# Patient Record
Sex: FEMALE | Race: ASIAN | Employment: FULL TIME | URBAN - METROPOLITAN AREA
[De-identification: names, ages, dates, MRNs, and addresses within clinical notes are randomized per-mention and may not be internally consistent; named-entity substitution may affect disease eponyms.]

---

## 2020-01-23 ENCOUNTER — HOSPITAL ENCOUNTER (OUTPATIENT)
Dept: GENERAL RADIOLOGY | Age: 65
Discharge: HOME | End: 2020-01-23
Attending: FAMILY MEDICINE
Payer: COMMERCIAL

## 2020-01-23 DIAGNOSIS — R05.9 COUGH: ICD-10-CM

## 2020-01-23 DIAGNOSIS — J44.9 CHRONIC OBSTRUCTIVE PULMONARY DISEASE, UNSPECIFIED COPD TYPE (HCC): ICD-10-CM

## 2020-01-23 DIAGNOSIS — K21.9 GASTROESOPHAGEAL REFLUX DISEASE WITHOUT ESOPHAGITIS: ICD-10-CM

## 2020-01-23 DIAGNOSIS — J30.1 ALLERGIC RHINITIS DUE TO POLLEN, UNSPECIFIED SEASONALITY: ICD-10-CM

## 2020-01-23 PROCEDURE — 71046 X-RAY EXAM CHEST 2 VIEWS: CPT

## 2020-08-19 PROBLEM — Z00.00 WELCOME TO MEDICARE PREVENTIVE VISIT: Status: ACTIVE | Noted: 2020-08-19

## 2020-08-19 PROBLEM — Z00.00 WELCOME TO MEDICARE PREVENTIVE VISIT: Chronic | Status: ACTIVE | Noted: 2020-08-19

## 2020-10-29 ENCOUNTER — HOSPITAL ENCOUNTER (OUTPATIENT)
Dept: LAB | Age: 65
Discharge: HOME | End: 2020-10-29
Payer: MEDICARE

## 2020-10-29 DIAGNOSIS — Z86.73 HISTORY OF STROKE: ICD-10-CM

## 2020-10-29 LAB
CRP SERPL-MCNC: <0.3 MG/DL (ref 0–0.9)
ERYTHROCYTE [SEDIMENTATION RATE] IN BLOOD: 12 MM/HR (ref 0–30)

## 2020-10-29 PROCEDURE — 86140 C-REACTIVE PROTEIN: CPT

## 2020-10-29 PROCEDURE — 85652 RBC SED RATE AUTOMATED: CPT

## 2020-10-29 PROCEDURE — 36415 COLL VENOUS BLD VENIPUNCTURE: CPT

## 2021-12-13 LAB — MAMMOGRAPHY, EXTERNAL: NORMAL

## 2022-03-19 PROBLEM — Z00.00 MEDICARE ANNUAL WELLNESS VISIT, SUBSEQUENT: Status: ACTIVE | Noted: 2020-08-19

## 2022-09-08 ENCOUNTER — OFFICE VISIT (OUTPATIENT)
Dept: FAMILY MEDICINE CLINIC | Facility: CLINIC | Age: 67
End: 2022-09-08
Payer: MEDICARE

## 2022-09-08 VITALS
WEIGHT: 97 LBS | SYSTOLIC BLOOD PRESSURE: 118 MMHG | BODY MASS INDEX: 19.56 KG/M2 | HEIGHT: 59 IN | DIASTOLIC BLOOD PRESSURE: 62 MMHG

## 2022-09-08 DIAGNOSIS — Z90.710 S/P TAH-BSO (TOTAL ABDOMINAL HYSTERECTOMY AND BILATERAL SALPINGO-OOPHORECTOMY): ICD-10-CM

## 2022-09-08 DIAGNOSIS — Z90.722 S/P TAH-BSO (TOTAL ABDOMINAL HYSTERECTOMY AND BILATERAL SALPINGO-OOPHORECTOMY): ICD-10-CM

## 2022-09-08 DIAGNOSIS — I10 ESSENTIAL HYPERTENSION: ICD-10-CM

## 2022-09-08 DIAGNOSIS — Z90.79 S/P TAH-BSO (TOTAL ABDOMINAL HYSTERECTOMY AND BILATERAL SALPINGO-OOPHORECTOMY): ICD-10-CM

## 2022-09-08 DIAGNOSIS — Z86.73 HISTORY OF CVA (CEREBROVASCULAR ACCIDENT): ICD-10-CM

## 2022-09-08 DIAGNOSIS — E78.00 HYPERCHOLESTEROLEMIA: ICD-10-CM

## 2022-09-08 DIAGNOSIS — I10 ESSENTIAL HYPERTENSION: Primary | ICD-10-CM

## 2022-09-08 PROBLEM — Z00.00 MEDICARE ANNUAL WELLNESS VISIT, SUBSEQUENT: Chronic | Status: ACTIVE | Noted: 2020-08-19

## 2022-09-08 PROBLEM — Z84.2 FAMILY HISTORY OF TOTAL ABDOMINAL HYSTERECTOMY AND BILATERAL SALPINGO-OOPHORECTOMY (TAH-BSO): Status: ACTIVE | Noted: 2022-09-08

## 2022-09-08 PROCEDURE — 1090F PRES/ABSN URINE INCON ASSESS: CPT | Performed by: FAMILY MEDICINE

## 2022-09-08 PROCEDURE — G8420 CALC BMI NORM PARAMETERS: HCPCS | Performed by: FAMILY MEDICINE

## 2022-09-08 PROCEDURE — 1123F ACP DISCUSS/DSCN MKR DOCD: CPT | Performed by: FAMILY MEDICINE

## 2022-09-08 PROCEDURE — G8400 PT W/DXA NO RESULTS DOC: HCPCS | Performed by: FAMILY MEDICINE

## 2022-09-08 PROCEDURE — 3017F COLORECTAL CA SCREEN DOC REV: CPT | Performed by: FAMILY MEDICINE

## 2022-09-08 PROCEDURE — 99214 OFFICE O/P EST MOD 30 MIN: CPT | Performed by: FAMILY MEDICINE

## 2022-09-08 PROCEDURE — G8428 CUR MEDS NOT DOCUMENT: HCPCS | Performed by: FAMILY MEDICINE

## 2022-09-08 PROCEDURE — 4004F PT TOBACCO SCREEN RCVD TLK: CPT | Performed by: FAMILY MEDICINE

## 2022-09-08 RX ORDER — OXYCODONE HYDROCHLORIDE 5 MG/1
5 TABLET ORAL EVERY 4 HOURS PRN
COMMUNITY

## 2022-09-08 RX ORDER — SIMVASTATIN 20 MG
20 TABLET ORAL NIGHTLY
Qty: 90 TABLET | Refills: 3 | Status: SHIPPED | OUTPATIENT
Start: 2022-09-08

## 2022-09-08 RX ORDER — PANTOPRAZOLE SODIUM 40 MG/1
40 TABLET, DELAYED RELEASE ORAL DAILY
Qty: 90 TABLET | Refills: 3 | Status: SHIPPED | OUTPATIENT
Start: 2022-09-08

## 2022-09-08 RX ORDER — PANTOPRAZOLE SODIUM 40 MG/1
40 TABLET, DELAYED RELEASE ORAL DAILY
COMMUNITY
End: 2022-09-08 | Stop reason: SDUPTHER

## 2022-09-08 RX ORDER — CLOPIDOGREL BISULFATE 75 MG/1
75 TABLET ORAL DAILY
Qty: 90 TABLET | Refills: 3 | Status: SHIPPED | OUTPATIENT
Start: 2022-09-08

## 2022-09-08 RX ORDER — ACETAMINOPHEN 325 MG/1
650 TABLET ORAL EVERY 6 HOURS PRN
COMMUNITY

## 2022-09-08 ASSESSMENT — PATIENT HEALTH QUESTIONNAIRE - PHQ9
SUM OF ALL RESPONSES TO PHQ9 QUESTIONS 1 & 2: 0
SUM OF ALL RESPONSES TO PHQ QUESTIONS 1-9: 0
2. FEELING DOWN, DEPRESSED OR HOPELESS: 0
SUM OF ALL RESPONSES TO PHQ QUESTIONS 1-9: 0
SUM OF ALL RESPONSES TO PHQ QUESTIONS 1-9: 0
1. LITTLE INTEREST OR PLEASURE IN DOING THINGS: 0
SUM OF ALL RESPONSES TO PHQ QUESTIONS 1-9: 0

## 2022-09-08 NOTE — PROGRESS NOTES
Inhale 2 puffs into the lungs every 6 hours as needed       No current facility-administered medications for this visit. Lab Results   Component Value Date/Time     02/14/2022 09:03 AM    K 3.9 02/14/2022 09:03 AM     02/14/2022 09:03 AM    CO2 24 02/14/2022 09:03 AM    BUN 16 02/14/2022 09:03 AM    CREATININE 0.59 02/14/2022 09:03 AM    GLUCOSE 97 02/14/2022 09:03 AM    CALCIUM 9.0 02/14/2022 09:03 AM        Objective:  Blood pressure 118/62, height 4' 11\" (1.499 m), weight 97 lb (44 kg). Body mass index is 19.59 kg/m². BP Readings from Last 3 Encounters:   09/08/22 118/62   05/19/22 122/70   02/14/22 128/60     General- Pleasant and no distress  Psych- alert and oriented to person, place and time  Mood and affect are appropriate to situation  Musculoskeletal - Gait and station examination reveals mid-position with no abnormalities. Neurological- grossly intact. rrr s mrg.   bcta  extremeties are without edema, and dp, and pt pulses are intact  No carotid bruits   Fundoscopic exam is: benign without retinopathology     Assessment:  1. Essential hypertension    2. Hypercholesterolemia    3. History of CVA (cerebrovascular accident)    4. S/P ALEX-BSO (total abdominal hysterectomy and bilateral salpingo-oophorectomy)        Plan:   Prescription drug management occurs today as follows:  Because current regimen for blood pressure is now working and tolerated, will continue medications as listed    Krystina Ramos has been given the following recommendations today due to her elevated BP reading: lab tests ordered. Personal instruction is given. For your information, consider the following:  Jasmyn Trevino is an excellent site that will give you a list of approved blood pressure devices  Brennen Burroughs is an excellent site that will teach you how to take accurate bp measurements at home. Significant weight loss can result in a drop of 5-10mm blood pressure.   A DASH diet (see bookstore or go to [email or web address] and print DASH diet) will lower 8-14mm blood pressure. Salt restriction will lower your bp 2-8mm. Lowering alcohol consumption to moderate use will lower your pressure 2-4mm. Continue efforts to maintain an exercise regimen. Normal blood pressure target is now 120/80. Stage 1 or \"pre-hypertension\" or \"high normal hypertension\" is 130-139/80-89. We sometimes begin treatment with medications. Stage 2 is >140/90 which is when we always begin treatment with medications. For high risk patients such as those with heart disease, a history of stents, angioplasty, heart attacks, strokes, diabetes and chronic kidney disease,your blood pressure goal is 130/80. For lower risk patients without the high risk categories, your goal for blood pressure is 139/89. Unless you are older than 72years old we may try for a systolic bp of 019 or we will accept higher pressures if the lower pressures are not tolerated. You can now be seen once a year for your medical conditions and refills. I gave you a 3 months supply of medicines with a years worth of refills today. Come in once a year for your medicare well visit. Your medicare well visit is soon. 1. Essential hypertension  -     Basic Metabolic Panel; Future  2. Hypercholesterolemia  -     Lipid Panel; Future  -     simvastatin (ZOCOR) 20 MG tablet; Take 1 tablet by mouth nightly, Disp-90 tablet, R-3Normal  3. History of CVA (cerebrovascular accident)  -     Lipid Panel; Future  -     clopidogrel (PLAVIX) 75 MG tablet; Take 1 tablet by mouth daily, Disp-90 tablet, R-3Normal  4. S/P ALEX-BSO (total abdominal hysterectomy and bilateral salpingo-oophorectomy)    Followup:  Return in about 1 year (around 9/8/2023), or annual labs. 1 month for overdue mwv.

## 2022-09-08 NOTE — PATIENT INSTRUCTIONS
You can now be seen once a year for your medical conditions and refills. I gave you a 3 months supply of medicines with a years worth of refills today. Come in once a year for your medicare well visit. Your medicare well visit is soon.

## 2022-09-09 LAB
ANION GAP SERPL CALC-SCNC: 0 MMOL/L (ref 4–13)
BUN SERPL-MCNC: 15 MG/DL (ref 8–23)
CALCIUM SERPL-MCNC: 9.1 MG/DL (ref 8.3–10.4)
CHLORIDE SERPL-SCNC: 109 MMOL/L (ref 101–110)
CHOLEST SERPL-MCNC: 146 MG/DL
CO2 SERPL-SCNC: 30 MMOL/L (ref 21–32)
CREAT SERPL-MCNC: 0.6 MG/DL (ref 0.6–1)
GLUCOSE SERPL-MCNC: 100 MG/DL (ref 65–100)
HDLC SERPL-MCNC: 66 MG/DL (ref 40–60)
HDLC SERPL: 2.2 {RATIO}
LDLC SERPL CALC-MCNC: 53.4 MG/DL
POTASSIUM SERPL-SCNC: 4.3 MMOL/L (ref 3.5–5.1)
SODIUM SERPL-SCNC: 139 MMOL/L (ref 136–145)
TRIGL SERPL-MCNC: 133 MG/DL (ref 35–150)
VLDLC SERPL CALC-MCNC: 26.6 MG/DL (ref 6–23)

## 2022-10-08 PROBLEM — Z00.00 MEDICARE ANNUAL WELLNESS VISIT, SUBSEQUENT: Chronic | Status: RESOLVED | Noted: 2020-08-19 | Resolved: 2022-10-08

## 2022-10-12 ENCOUNTER — OFFICE VISIT (OUTPATIENT)
Dept: FAMILY MEDICINE CLINIC | Facility: CLINIC | Age: 67
End: 2022-10-12
Payer: MEDICARE

## 2022-10-12 VITALS
HEIGHT: 59 IN | BODY MASS INDEX: 20.36 KG/M2 | DIASTOLIC BLOOD PRESSURE: 64 MMHG | SYSTOLIC BLOOD PRESSURE: 108 MMHG | WEIGHT: 101 LBS

## 2022-10-12 DIAGNOSIS — Z00.00 MEDICARE ANNUAL WELLNESS VISIT, SUBSEQUENT: Chronic | ICD-10-CM

## 2022-10-12 DIAGNOSIS — Z71.89 ADVANCED CARE PLANNING/COUNSELING DISCUSSION: ICD-10-CM

## 2022-10-12 DIAGNOSIS — Z23 IMMUNIZATION, PNEUMOCOCCUS AND INFLUENZA: Primary | ICD-10-CM

## 2022-10-12 PROCEDURE — G0009 ADMIN PNEUMOCOCCAL VACCINE: HCPCS | Performed by: FAMILY MEDICINE

## 2022-10-12 PROCEDURE — 3017F COLORECTAL CA SCREEN DOC REV: CPT | Performed by: FAMILY MEDICINE

## 2022-10-12 PROCEDURE — 90677 PCV20 VACCINE IM: CPT | Performed by: FAMILY MEDICINE

## 2022-10-12 PROCEDURE — 1123F ACP DISCUSS/DSCN MKR DOCD: CPT | Performed by: FAMILY MEDICINE

## 2022-10-12 PROCEDURE — G8484 FLU IMMUNIZE NO ADMIN: HCPCS | Performed by: FAMILY MEDICINE

## 2022-10-12 PROCEDURE — 99497 ADVNCD CARE PLAN 30 MIN: CPT | Performed by: FAMILY MEDICINE

## 2022-10-12 PROCEDURE — G0439 PPPS, SUBSEQ VISIT: HCPCS | Performed by: FAMILY MEDICINE

## 2022-10-12 ASSESSMENT — PATIENT HEALTH QUESTIONNAIRE - PHQ9
2. FEELING DOWN, DEPRESSED OR HOPELESS: 0
SUM OF ALL RESPONSES TO PHQ QUESTIONS 1-9: 0
SUM OF ALL RESPONSES TO PHQ9 QUESTIONS 1 & 2: 0
SUM OF ALL RESPONSES TO PHQ QUESTIONS 1-9: 0
1. LITTLE INTEREST OR PLEASURE IN DOING THINGS: 0
SUM OF ALL RESPONSES TO PHQ QUESTIONS 1-9: 0
SUM OF ALL RESPONSES TO PHQ QUESTIONS 1-9: 0

## 2022-10-12 ASSESSMENT — LIFESTYLE VARIABLES
HOW OFTEN DO YOU HAVE A DRINK CONTAINING ALCOHOL: NEVER
HOW MANY STANDARD DRINKS CONTAINING ALCOHOL DO YOU HAVE ON A TYPICAL DAY: PATIENT DECLINED

## 2022-10-12 NOTE — PATIENT INSTRUCTIONS
Personalized Preventive Plan for Leonie Mirza - 10/12/2022  Medicare offers a range of preventive health benefits. Some of the tests and screenings are paid in full while other may be subject to a deductible, co-insurance, and/or copay. Some of these benefits include a comprehensive review of your medical history including lifestyle, illnesses that may run in your family, and various assessments and screenings as appropriate. After reviewing your medical record and screening and assessments performed today your provider may have ordered immunizations, labs, imaging, and/or referrals for you. A list of these orders (if applicable) as well as your Preventive Care list are included within your After Visit Summary for your review. Other Preventive Recommendations:    A preventive eye exam performed by an eye specialist is recommended every 1-2 years to screen for glaucoma; cataracts, macular degeneration, and other eye disorders. A preventive dental visit is recommended every 6 months. Try to get at least 150 minutes of exercise per week or 10,000 steps per day on a pedometer . Order or download the FREE \"Exercise & Physical Activity: Your Everyday Guide\" from The [employer]. Call 5-502.214.7960 or search The [employer] online. You need 9826-1540 mg of calcium and 4826-1616 IU of vitamin D per day. It is possible to meet your calcium requirement with diet alone, but a vitamin D supplement is usually necessary to meet this goal.  When exposed to the sun, use a sunscreen that protects against both UVA and UVB radiation with an SPF of 30 or greater. Reapply every 2 to 3 hours or after sweating, drying off with a towel, or swimming. Always wear a seat belt when traveling in a car. Always wear a helmet when riding a bicycle or motorcycle.   I want to emphasize that you need to let others know about your care preferences including your family members and maybe even a close friend or neighbor you trust.  I have explained what an advanced care directive is and will give you more information at your exit today on your out-processing paper. Most of these wishes will require completion of standard forms so be prepared to sit down and fill those out. You will then want to keep copies in your home, probably a copy with a close relative, and one in your lock-box. You will want to include your short term philosophy of treatment options that you are comfortable with as well as the more important preferences for long-term goals and choices for care  It is important that you include the types of care that you will prefer such as aggressive surgeries or radiation/chemotherapy should you contract a cancer; whether you want to be hospitalized for inoperable cancers; whether you decide to be a \"no code\"or similar decision to avoid \"heroic\" measures such as cpr, intubation, feeding tubes etc.    Also your feelings about Palliative Care in your home should you have a terminal disease OR Palliative Care in a separate facility of your choice. Comfort level is something you must decide on and inform a family member your desires for that. The absolute bare minimum of what you need to have is a Living Will and a [address]. A \"health care power of \" is a document that allows you to appoint someone to make your health care decisions in the event that you are unable to to so. You should also appoint a backup person in the event your first choice is not available. This document can be printed from the following website and completed free of charge without the services of an :  [email or web address].gov/SiteCollectionDocuments/[email or web address] [email or web address]. pdf    A \"Living Will\" is a document that gives you the ability to state your wishes in writing about end of life health care decisions.   It allows you to choose if you will receive CPR in the event of a life threatening emergency. It also allows you to choose or refuse specific emergency treatments such as chest compressions, artificial respirations, emergency medications, a mechanical ventilator and/or a tube feeding. This document can be prepared in the privacy of your own home and notarized at your bank. The Living Will form can be printed out at this website: [email or web address].sc.gov/SiteCollectionDocuments/L/[email or web address]. .pdf    Because of the [place] pandemic, physicians are learning of the importance of speaking with our patients regarding their preferences for end-of-life medical care and other measures aimed at prolonging health and extending life. The importance of understanding your values and goals if you were to develop a serious illness requiring hospitalization, mechanical ventilation, or CPR not limited to something like COVID-19, but influenza, and other serious illnesses is important so I want to document your preferences for the following:  Who is your Medical Decision Maker should you become incapacitated?zulma Waddell  [phone]  CPR preference? yes do  Ventilator preference? yes do  Hospitalization preference? closest, either    You may bring these completed forms to the office to be scanned into your medical record, but it is more important that you give a copy to each of your family members, discuss them with your chosen representative, and let them know where the original is kept.

## 2022-10-12 NOTE — PROGRESS NOTES
Medicare Annual Wellness Visit    [address] is here for Medicare AWV    Assessment & Plan   Immunization, pneumococcus and influenza  -     Pneumococcal, PCV20, PREVNAR 21, (age 25 yrs+), IM, PF  Medicare annual wellness visit, subsequent  Advanced care planning/counseling discussion    Recommendations for Preventive Services Due: see orders and patient instructions/AVS.  Recommended screening schedule for the next 5-10 years is provided to the patient in written form: see Patient Instructions/AVS.  I also wanted to take advantage of their presence here today to complete an   [address] which is voluntary (at discretion of the patient but I have informed them that this service has been reccommended as a worthwhile service). No follow-ups on file. Subjective       Patient's complete Health Risk Assessment and screening values have been reviewed and are found in Flowsheets. The following problems were reviewed today and where indicated follow up appointments were made and/or referrals ordered. Positive Risk Factor Screenings with Interventions:             General Health and ACP:  General  In general, how would you say your health is?: Fair  In the past 7 days, have you experienced any of the following: New or Increased Pain, New or Increased Fatigue, Loneliness, Social Isolation, Stress or Anger?: No  Do you get the social and emotional support that you need?: Yes  Do you have a Living Will?: (!) No    Advance Directives       Power of  Living Will ACP-Advance Directive ACP-Power of     Not on File Not on File Not on File Not on File          General Health Risk Interventions:   Will address living will      Safety:  Do you have working smoke detectors?: Yes  Do you have any tripping hazards - loose or unsecured carpets or rugs?: No  Do you have any tripping hazards - clutter in doorways, halls, or stairs?: No  Do you have either shower bars, grab bars, non-slip mats or non-slip surfaces in your shower or bathtub?: (!) No  Do all of your stairways have a railing or banister?: Not Applicable  Do you always fasten your seatbelt when you are in a car?: Yes  Safety Interventions:  aware           Objective   Vitals:    10/12/22 1024   BP: 108/64   Site: Left Upper Arm   Position: Sitting   Weight: 101 lb (45.8 kg)   Height: 4' 11\" (1.499 m)      Body mass index is 20.4 kg/m². Allergies   Allergen Reactions    Gemfibrozil Other (See Comments)    Ibandronic Acid Other (See Comments)    Ibuprofen Other (See Comments)     Prior to Visit Medications    Medication Sig Taking? Authorizing Provider   acetaminophen (TYLENOL) 325 MG tablet Take 650 mg by mouth every 6 hours as needed for Pain Take 2 tablets by mouth every 6 hours as needed for mild to moderate pain. Historical Provider, MD   oxyCODONE (ROXICODONE) 5 MG immediate release tablet Take 5 mg by mouth every 4 hours as needed for Pain. Historical Provider, MD   pantoprazole (PROTONIX) 40 MG tablet Take 1 tablet by mouth daily  Betsy Lugo MD   clopidogrel (PLAVIX) 75 MG tablet Take 1 tablet by mouth daily  Betsy Lugo MD   simvastatin (ZOCOR) 20 MG tablet Take 1 tablet by mouth nightly  Betsy Lugo MD   albuterol sulfate  (90 Base) MCG/ACT inhaler Inhale 2 puffs into the lungs every 6 hours as needed  Ar Automatic Reconciliation   MAMMOGRAM:  is due and reccommended  PNEUMONIA VACCINES: Prevnar is due because of HIGH RISK  SHINGLES VACCINE:  Is currently up to date or is clinically not indicated  COLONOSCOPY: Is currently up to date or is clinically not indicated  FIT/COLOGUARD CANDIDATE?:  (Age 46-80, no history of adenomatous polyps, UC or Chrohns or Fam Hx Colon Ca) NO  GLAUCOMA/VISION SCREENING:  Is currently up to date or is clinically not indicated  DEXA SCREENING: initial screening is reccommended in all men over the age of 79 and all women over age 72.   Is currently up to date or is clinically not indicated     needed to discuss advanced care directive, end of life planning, will, etc; information given? Yes    Patient is the designated person who is capable of making the decisions we will discuss  Some of today's visit spent counseling for [address] We mainly discuss future care decisions that need to be made or will soon need to be made. I want to emphasize that you need to let others know about your care preferences including your family members and maybe even a close friend or neighbor you trust.  I have explained what an advanced care directive is and will give you more information at your exit today on your out-processing paper. Most of these wishes will require completion of standard forms so be prepared to sit down and fill those out. You will then want to keep copies in your home, probably a copy with a close relative, and one in your lock-box. You will want to include your short term philosophy of treatment options that you are comfortable with as well as the more important preferences for long-term goals and choices for care  It is important that you include the types of care that you will prefer such as aggressive surgeries or radiation/chemotherapy should you contract a cancer; whether you want to be hospitalized for inoperable cancers; whether you decide to be a \"no code\"or similar decision to avoid \"heroic\" measures such as cpr, intubation, feeding tubes etc.    Also your feelings about Palliative Care in your home should you have a terminal disease OR Palliative Care in a separate facility of your choice. Comfort level is something you must decide on and inform a family member your desires for that. The absolute bare minimum of what you need to have is a Living Will and a [address].     A \"health care power of \" is a document that allows you to appoint someone to make your health care decisions in the event that you are unable to to so.  You should also appoint a backup person in the event your first choice is not available. This document can be printed from the following website and completed free of charge without the services of an :  [email or web address].sc.gov/SiteCollectionDocuments/S/SCHeGundersen St Joseph's Hospital and ClinicsPowerOfAttorney. pdf    A \"Living Will\" is a document that gives you the ability to state your wishes in writing about end of life health care decisions. It allows you to choose if you will receive CPR in the event of a life threatening emergency. It also allows you to choose or refuse specific emergency treatments such as chest compressions, artificial respirations, emergency medications, a mechanical ventilator and/or a tube feeding. This document can be prepared in the privacy of your own home and notarized at your bank. The Living Will form can be printed out at this website: http://aging.sc.gov/SiteCollectionDocuments/L/[email or web address]%28Bar%29. .pdf    Because of the [place] pandemic, physicians are learning of the importance of speaking with our patients regarding their preferences for end-of-life medical care and other measures aimed at prolonging health and extending life. The importance of understanding your values and goals if you were to develop a serious illness requiring hospitalization, mechanical ventilation, or CPR not limited to something like COVID-19, but influenza, and other serious illnesses is important so I want to document your preferences for the following:  Who is your Medical Decision Maker should you become incapacitated?zulma Waddell  [phone]  CPR preference? yes do  Ventilator preference? yes do  Hospitalization preference? closest, either    You may bring these completed forms to the office to be scanned into your medical record, but it is more important that you give a copy to each of your family members, discuss them with your chosen representative, and let them know where the original is kept.     Face to Face time pertaining to the Advanced Care dialogue only (not the other portions of the office visit related to evaluation and management of medical problems) with the patient,  first 30 minutes; specifically, the approximate time I spent on this today was right at 16-17minutes. Assessment/Plan:     1. Immunization, pneumococcus and influenza    2. Medicare annual wellness visit, subsequent    3.  Advanced care planning/counseling discussion      CareTeam (Including outside providers/suppliers regularly involved in providing care):   Patient Care Team:  Guadalupe Snellen, MD as PCP - Eliazar Gandhi MD as PCP - [facility] Empaneled Provider     Reviewed and updated this visit:  Allergies

## 2022-11-11 PROBLEM — Z00.00 MEDICARE ANNUAL WELLNESS VISIT, SUBSEQUENT: Chronic | Status: RESOLVED | Noted: 2020-08-19 | Resolved: 2022-11-11

## 2023-09-08 ENCOUNTER — OFFICE VISIT (OUTPATIENT)
Dept: FAMILY MEDICINE CLINIC | Facility: CLINIC | Age: 68
End: 2023-09-08

## 2023-09-08 VITALS
TEMPERATURE: 97 F | SYSTOLIC BLOOD PRESSURE: 130 MMHG | OXYGEN SATURATION: 97 % | WEIGHT: 110.2 LBS | RESPIRATION RATE: 16 BRPM | BODY MASS INDEX: 22.22 KG/M2 | HEART RATE: 64 BPM | HEIGHT: 59 IN | DIASTOLIC BLOOD PRESSURE: 70 MMHG

## 2023-09-08 DIAGNOSIS — I10 ESSENTIAL HYPERTENSION: Primary | ICD-10-CM

## 2023-09-08 DIAGNOSIS — E78.00 HYPERCHOLESTEROLEMIA: ICD-10-CM

## 2023-09-08 DIAGNOSIS — Z12.11 SPECIAL SCREENING FOR MALIGNANT NEOPLASMS, COLON: ICD-10-CM

## 2023-09-08 DIAGNOSIS — Z86.73 HISTORY OF CVA (CEREBROVASCULAR ACCIDENT): ICD-10-CM

## 2023-09-08 DIAGNOSIS — J41.0 SIMPLE CHRONIC BRONCHITIS (HCC): ICD-10-CM

## 2023-09-08 LAB
ALBUMIN SERPL-MCNC: 3.8 G/DL (ref 3.2–4.6)
ALBUMIN/GLOB SERPL: 1 (ref 0.4–1.6)
ALP SERPL-CCNC: 100 U/L (ref 50–136)
ALT SERPL-CCNC: 21 U/L (ref 12–65)
ANION GAP SERPL CALC-SCNC: 5 MMOL/L (ref 2–11)
AST SERPL-CCNC: 14 U/L (ref 15–37)
BILIRUB DIRECT SERPL-MCNC: 0.1 MG/DL
BILIRUB SERPL-MCNC: 0.4 MG/DL (ref 0.2–1.1)
BUN SERPL-MCNC: 16 MG/DL (ref 8–23)
CALCIUM SERPL-MCNC: 8.9 MG/DL (ref 8.3–10.4)
CHLORIDE SERPL-SCNC: 110 MMOL/L (ref 101–110)
CHOLEST SERPL-MCNC: 179 MG/DL
CO2 SERPL-SCNC: 27 MMOL/L (ref 21–32)
CREAT SERPL-MCNC: 0.7 MG/DL (ref 0.6–1)
GLOBULIN SER CALC-MCNC: 3.9 G/DL (ref 2.8–4.5)
GLUCOSE SERPL-MCNC: 102 MG/DL (ref 65–100)
HDLC SERPL-MCNC: 70 MG/DL (ref 40–60)
HDLC SERPL: 2.6
LDLC SERPL CALC-MCNC: 61.6 MG/DL
POTASSIUM SERPL-SCNC: 4.1 MMOL/L (ref 3.5–5.1)
PROT SERPL-MCNC: 7.7 G/DL (ref 6.3–8.2)
SODIUM SERPL-SCNC: 142 MMOL/L (ref 133–143)
TRIGL SERPL-MCNC: 237 MG/DL (ref 35–150)
VLDLC SERPL CALC-MCNC: 47.4 MG/DL (ref 6–23)

## 2023-09-08 RX ORDER — ALENDRONATE SODIUM 70 MG/1
70 TABLET ORAL
COMMUNITY

## 2023-09-08 RX ORDER — CLOPIDOGREL BISULFATE 75 MG/1
75 TABLET ORAL DAILY
Qty: 90 TABLET | Refills: 3 | Status: SHIPPED | OUTPATIENT
Start: 2023-09-08

## 2023-09-08 RX ORDER — PANTOPRAZOLE SODIUM 40 MG/1
40 TABLET, DELAYED RELEASE ORAL DAILY
Qty: 90 TABLET | Refills: 3 | Status: SHIPPED | OUTPATIENT
Start: 2023-09-08

## 2023-09-08 RX ORDER — SIMVASTATIN 20 MG
20 TABLET ORAL NIGHTLY
Qty: 90 TABLET | Refills: 3 | Status: SHIPPED | OUTPATIENT
Start: 2023-09-08

## 2023-09-08 SDOH — ECONOMIC STABILITY: FOOD INSECURITY: WITHIN THE PAST 12 MONTHS, YOU WORRIED THAT YOUR FOOD WOULD RUN OUT BEFORE YOU GOT MONEY TO BUY MORE.: NEVER TRUE

## 2023-09-08 SDOH — ECONOMIC STABILITY: INCOME INSECURITY: HOW HARD IS IT FOR YOU TO PAY FOR THE VERY BASICS LIKE FOOD, HOUSING, MEDICAL CARE, AND HEATING?: NOT VERY HARD

## 2023-09-08 SDOH — ECONOMIC STABILITY: FOOD INSECURITY: WITHIN THE PAST 12 MONTHS, THE FOOD YOU BOUGHT JUST DIDN'T LAST AND YOU DIDN'T HAVE MONEY TO GET MORE.: NEVER TRUE

## 2023-09-08 SDOH — ECONOMIC STABILITY: HOUSING INSECURITY
IN THE LAST 12 MONTHS, WAS THERE A TIME WHEN YOU DID NOT HAVE A STEADY PLACE TO SLEEP OR SLEPT IN A SHELTER (INCLUDING NOW)?: NO

## 2023-09-08 ASSESSMENT — PATIENT HEALTH QUESTIONNAIRE - PHQ9
SUM OF ALL RESPONSES TO PHQ QUESTIONS 1-9: 0
1. LITTLE INTEREST OR PLEASURE IN DOING THINGS: 0
SUM OF ALL RESPONSES TO PHQ QUESTIONS 1-9: 0
2. FEELING DOWN, DEPRESSED OR HOPELESS: 0
SUM OF ALL RESPONSES TO PHQ9 QUESTIONS 1 & 2: 0

## 2023-10-25 ENCOUNTER — OFFICE VISIT (OUTPATIENT)
Dept: FAMILY MEDICINE CLINIC | Facility: CLINIC | Age: 68
End: 2023-10-25
Payer: MEDICARE

## 2023-10-25 VITALS
SYSTOLIC BLOOD PRESSURE: 150 MMHG | WEIGHT: 110 LBS | RESPIRATION RATE: 16 BRPM | HEIGHT: 59 IN | TEMPERATURE: 97.3 F | BODY MASS INDEX: 22.18 KG/M2 | HEART RATE: 69 BPM | DIASTOLIC BLOOD PRESSURE: 74 MMHG | OXYGEN SATURATION: 100 %

## 2023-10-25 DIAGNOSIS — Z00.00 MEDICARE ANNUAL WELLNESS VISIT, SUBSEQUENT: Primary | Chronic | ICD-10-CM

## 2023-10-25 DIAGNOSIS — Z98.890 HISTORY OF COLONOSCOPY: ICD-10-CM

## 2023-10-25 PROCEDURE — 1123F ACP DISCUSS/DSCN MKR DOCD: CPT | Performed by: FAMILY MEDICINE

## 2023-10-25 PROCEDURE — 3078F DIAST BP <80 MM HG: CPT | Performed by: FAMILY MEDICINE

## 2023-10-25 PROCEDURE — G0439 PPPS, SUBSEQ VISIT: HCPCS | Performed by: FAMILY MEDICINE

## 2023-10-25 PROCEDURE — 3077F SYST BP >= 140 MM HG: CPT | Performed by: FAMILY MEDICINE

## 2023-10-25 PROCEDURE — 3017F COLORECTAL CA SCREEN DOC REV: CPT | Performed by: FAMILY MEDICINE

## 2023-10-25 PROCEDURE — G8484 FLU IMMUNIZE NO ADMIN: HCPCS | Performed by: FAMILY MEDICINE

## 2023-10-25 ASSESSMENT — PATIENT HEALTH QUESTIONNAIRE - PHQ9
SUM OF ALL RESPONSES TO PHQ9 QUESTIONS 1 & 2: 0
SUM OF ALL RESPONSES TO PHQ QUESTIONS 1-9: 0
1. LITTLE INTEREST OR PLEASURE IN DOING THINGS: 0
SUM OF ALL RESPONSES TO PHQ QUESTIONS 1-9: 0
SUM OF ALL RESPONSES TO PHQ QUESTIONS 1-9: 0
2. FEELING DOWN, DEPRESSED OR HOPELESS: 0
SUM OF ALL RESPONSES TO PHQ QUESTIONS 1-9: 0

## 2023-10-25 ASSESSMENT — LIFESTYLE VARIABLES
HOW OFTEN DO YOU HAVE A DRINK CONTAINING ALCOHOL: NEVER
HOW MANY STANDARD DRINKS CONTAINING ALCOHOL DO YOU HAVE ON A TYPICAL DAY: PATIENT DOES NOT DRINK

## 2023-10-25 NOTE — PATIENT INSTRUCTIONS
benefits include a comprehensive review of your medical history including lifestyle, illnesses that may run in your family, and various assessments and screenings as appropriate. After reviewing your medical record and screening and assessments performed today your provider may have ordered immunizations, labs, imaging, and/or referrals for you. A list of these orders (if applicable) as well as your Preventive Care list are included within your After Visit Summary for your review. Other Preventive Recommendations:    A preventive eye exam performed by an eye specialist is recommended every 1-2 years to screen for glaucoma; cataracts, macular degeneration, and other eye disorders. A preventive dental visit is recommended every 6 months. Try to get at least 150 minutes of exercise per week or 10,000 steps per day on a pedometer . Order or download the FREE \"Exercise & Physical Activity: Your Everyday Guide\" from [employer]. Call 9-347.165.1275 or search [employer] online. You need 2911-7308 mg of calcium and 2186-5042 IU of vitamin D per day. It is possible to meet your calcium requirement with diet alone, but a vitamin D supplement is usually necessary to meet this goal.  When exposed to the sun, use a sunscreen that protects against both UVA and UVB radiation with an SPF of 30 or greater. Reapply every 2 to 3 hours or after sweating, drying off with a towel, or swimming. Always wear a seat belt when traveling in a car. Always wear a helmet when riding a bicycle or motorcycle.

## 2024-01-24 ENCOUNTER — OFFICE VISIT (OUTPATIENT)
Dept: FAMILY MEDICINE CLINIC | Facility: CLINIC | Age: 69
End: 2024-01-24
Payer: MEDICARE

## 2024-01-24 VITALS
RESPIRATION RATE: 16 BRPM | BODY MASS INDEX: 21.97 KG/M2 | WEIGHT: 109 LBS | DIASTOLIC BLOOD PRESSURE: 90 MMHG | OXYGEN SATURATION: 95 % | HEART RATE: 109 BPM | TEMPERATURE: 98.8 F | HEIGHT: 59 IN | SYSTOLIC BLOOD PRESSURE: 151 MMHG

## 2024-01-24 DIAGNOSIS — R09.81 NASAL CONGESTION: ICD-10-CM

## 2024-01-24 DIAGNOSIS — R05.1 ACUTE COUGH: ICD-10-CM

## 2024-01-24 DIAGNOSIS — J41.0 SIMPLE CHRONIC BRONCHITIS (HCC): ICD-10-CM

## 2024-01-24 DIAGNOSIS — U07.1 COVID-19: Primary | ICD-10-CM

## 2024-01-24 LAB
EXP DATE SOLUTION: ABNORMAL
EXP DATE SWAB: ABNORMAL
EXPIRATION DATE: ABNORMAL
LOT NUMBER POC: ABNORMAL
LOT NUMBER SOLUTION: ABNORMAL
LOT NUMBER SWAB: ABNORMAL
SARS-COV-2 RNA, POC: POSITIVE

## 2024-01-24 PROCEDURE — G8399 PT W/DXA RESULTS DOCUMENT: HCPCS | Performed by: FAMILY MEDICINE

## 2024-01-24 PROCEDURE — 3023F SPIROM DOC REV: CPT | Performed by: FAMILY MEDICINE

## 2024-01-24 PROCEDURE — 1123F ACP DISCUSS/DSCN MKR DOCD: CPT | Performed by: FAMILY MEDICINE

## 2024-01-24 PROCEDURE — G8420 CALC BMI NORM PARAMETERS: HCPCS | Performed by: FAMILY MEDICINE

## 2024-01-24 PROCEDURE — 87635 SARS-COV-2 COVID-19 AMP PRB: CPT | Performed by: FAMILY MEDICINE

## 2024-01-24 PROCEDURE — 99213 OFFICE O/P EST LOW 20 MIN: CPT | Performed by: FAMILY MEDICINE

## 2024-01-24 PROCEDURE — 3080F DIAST BP >= 90 MM HG: CPT | Performed by: FAMILY MEDICINE

## 2024-01-24 PROCEDURE — G8427 DOCREV CUR MEDS BY ELIG CLIN: HCPCS | Performed by: FAMILY MEDICINE

## 2024-01-24 PROCEDURE — 3017F COLORECTAL CA SCREEN DOC REV: CPT | Performed by: FAMILY MEDICINE

## 2024-01-24 PROCEDURE — 3077F SYST BP >= 140 MM HG: CPT | Performed by: FAMILY MEDICINE

## 2024-01-24 PROCEDURE — G8484 FLU IMMUNIZE NO ADMIN: HCPCS | Performed by: FAMILY MEDICINE

## 2024-01-24 PROCEDURE — 1036F TOBACCO NON-USER: CPT | Performed by: FAMILY MEDICINE

## 2024-01-24 PROCEDURE — 1090F PRES/ABSN URINE INCON ASSESS: CPT | Performed by: FAMILY MEDICINE

## 2024-01-24 RX ORDER — CHLORHEXIDINE GLUCONATE ORAL RINSE 1.2 MG/ML
SOLUTION DENTAL
COMMUNITY
Start: 2023-12-16

## 2024-01-24 ASSESSMENT — PATIENT HEALTH QUESTIONNAIRE - PHQ9
SUM OF ALL RESPONSES TO PHQ QUESTIONS 1-9: 0
2. FEELING DOWN, DEPRESSED OR HOPELESS: 0
SUM OF ALL RESPONSES TO PHQ QUESTIONS 1-9: 0
SUM OF ALL RESPONSES TO PHQ9 QUESTIONS 1 & 2: 0
SUM OF ALL RESPONSES TO PHQ QUESTIONS 1-9: 0
SUM OF ALL RESPONSES TO PHQ QUESTIONS 1-9: 0
1. LITTLE INTEREST OR PLEASURE IN DOING THINGS: 0

## 2024-01-24 NOTE — PROGRESS NOTES
Subjective:  Rocco Lund is a 69 y.o. female presents today with a history that started 2 days ago with nasal congestion and some chest congestion and a cough.  Maybe a chill.  No shortness of breath.  Allergies   Allergen Reactions    Gemfibrozil Other (See Comments)    Ibandronic Acid Other (See Comments)    Ibuprofen Other (See Comments)     PMH, MEDS reviewed  PHQ-9 Total Score: 0 (1/24/2024  1:33 PM)    Objective:  Blood pressure (!) 151/90, pulse (!) 109, temperature 98.8 °F (37.1 °C), temperature source Temporal, resp. rate 16, height 1.499 m (4' 11\"), weight 49.4 kg (109 lb), SpO2 95 %.   General- pleasant, no distress  Psych- alert and oriented to person, place and time  Mood and affect are appropriate to the visit  rrr s mrg. bcta  Covid-19 test pos    Assessment:  1. COVID-19    2. Acute cough    3. Nasal congestion    4. Simple chronic bronchitis (HCC)         Plan:   Reassurance given, tylenol (acetominophen) for fever, analgesia.  Warm compresses sometimes help the discomfort.  otc  [place] or [place] or similar nasal spray or gel will help with hydration, and discomfort.   Caution if taking benadryl or anything with benadryl in it or other antihistamines that sometimes can cause drowsiness. Use caution when operating machinery and/or driving.  Consider netti-pot or similar over the counter irrigation method for your sinuses.  This infection will resolve in 7-14 days.  If you are no better by then, please call us as noted in the follow up advice below.   Stop your simvastatin cholesterol medicine for the next 10 days    Followup:  Return if symptoms worsen or fail to improve.  Call if questions, no improvement or fever develops and if symptoms worsen. I might consider calling in an antibiotic if there is no improvement in about 10 days and I suspect that your infection is bacterial and not viral.

## 2024-09-09 ENCOUNTER — OFFICE VISIT (OUTPATIENT)
Dept: FAMILY MEDICINE CLINIC | Facility: CLINIC | Age: 69
End: 2024-09-09
Payer: MEDICARE

## 2024-09-09 VITALS
WEIGHT: 105.8 LBS | SYSTOLIC BLOOD PRESSURE: 138 MMHG | TEMPERATURE: 97.6 F | BODY MASS INDEX: 21.33 KG/M2 | OXYGEN SATURATION: 96 % | DIASTOLIC BLOOD PRESSURE: 73 MMHG | HEIGHT: 59 IN | HEART RATE: 70 BPM

## 2024-09-09 DIAGNOSIS — Z78.0 POST-MENOPAUSAL: ICD-10-CM

## 2024-09-09 DIAGNOSIS — M85.80 OSTEOPENIA, UNSPECIFIED LOCATION: ICD-10-CM

## 2024-09-09 DIAGNOSIS — E78.00 HYPERCHOLESTEROLEMIA: Primary | ICD-10-CM

## 2024-09-09 DIAGNOSIS — I10 ESSENTIAL HYPERTENSION: ICD-10-CM

## 2024-09-09 DIAGNOSIS — Z86.73 HISTORY OF CVA (CEREBROVASCULAR ACCIDENT): ICD-10-CM

## 2024-09-09 DIAGNOSIS — K21.9 GASTROESOPHAGEAL REFLUX DISEASE WITHOUT ESOPHAGITIS: ICD-10-CM

## 2024-09-09 LAB
ANION GAP SERPL CALC-SCNC: 11 MMOL/L (ref 9–18)
BUN SERPL-MCNC: 9 MG/DL (ref 8–23)
CALCIUM SERPL-MCNC: 9.3 MG/DL (ref 8.8–10.2)
CHLORIDE SERPL-SCNC: 104 MMOL/L (ref 98–107)
CHOLEST SERPL-MCNC: 162 MG/DL (ref 0–200)
CO2 SERPL-SCNC: 26 MMOL/L (ref 20–28)
CREAT SERPL-MCNC: 0.58 MG/DL (ref 0.6–1.1)
GLUCOSE SERPL-MCNC: 100 MG/DL (ref 70–99)
HDLC SERPL-MCNC: 74 MG/DL (ref 40–60)
HDLC SERPL: 2.2 (ref 0–5)
LDLC SERPL CALC-MCNC: 62 MG/DL (ref 0–100)
POTASSIUM SERPL-SCNC: 4.1 MMOL/L (ref 3.5–5.1)
SODIUM SERPL-SCNC: 141 MMOL/L (ref 136–145)
TRIGL SERPL-MCNC: 128 MG/DL (ref 0–150)
VLDLC SERPL CALC-MCNC: 26 MG/DL (ref 6–23)

## 2024-09-09 PROCEDURE — 1036F TOBACCO NON-USER: CPT | Performed by: FAMILY MEDICINE

## 2024-09-09 PROCEDURE — G8399 PT W/DXA RESULTS DOCUMENT: HCPCS | Performed by: FAMILY MEDICINE

## 2024-09-09 PROCEDURE — 3078F DIAST BP <80 MM HG: CPT | Performed by: FAMILY MEDICINE

## 2024-09-09 PROCEDURE — 1090F PRES/ABSN URINE INCON ASSESS: CPT | Performed by: FAMILY MEDICINE

## 2024-09-09 PROCEDURE — G8420 CALC BMI NORM PARAMETERS: HCPCS | Performed by: FAMILY MEDICINE

## 2024-09-09 PROCEDURE — 1123F ACP DISCUSS/DSCN MKR DOCD: CPT | Performed by: FAMILY MEDICINE

## 2024-09-09 PROCEDURE — 3017F COLORECTAL CA SCREEN DOC REV: CPT | Performed by: FAMILY MEDICINE

## 2024-09-09 PROCEDURE — 3075F SYST BP GE 130 - 139MM HG: CPT | Performed by: FAMILY MEDICINE

## 2024-09-09 PROCEDURE — G8427 DOCREV CUR MEDS BY ELIG CLIN: HCPCS | Performed by: FAMILY MEDICINE

## 2024-09-09 PROCEDURE — 99214 OFFICE O/P EST MOD 30 MIN: CPT | Performed by: FAMILY MEDICINE

## 2024-09-09 RX ORDER — ALENDRONATE SODIUM 70 MG/1
70 TABLET ORAL
Qty: 90 TABLET | Refills: 3 | Status: SHIPPED | OUTPATIENT
Start: 2024-09-09

## 2024-09-09 RX ORDER — SIMVASTATIN 20 MG
20 TABLET ORAL NIGHTLY
Qty: 90 TABLET | Refills: 3 | Status: SHIPPED | OUTPATIENT
Start: 2024-09-09

## 2024-09-09 RX ORDER — CLOPIDOGREL BISULFATE 75 MG/1
75 TABLET ORAL DAILY
Qty: 90 TABLET | Refills: 3 | Status: SHIPPED | OUTPATIENT
Start: 2024-09-09

## 2024-09-09 RX ORDER — PANTOPRAZOLE SODIUM 40 MG/1
40 TABLET, DELAYED RELEASE ORAL DAILY
Qty: 90 TABLET | Refills: 3 | Status: SHIPPED | OUTPATIENT
Start: 2024-09-09

## 2024-09-09 SDOH — ECONOMIC STABILITY: FOOD INSECURITY: WITHIN THE PAST 12 MONTHS, THE FOOD YOU BOUGHT JUST DIDN'T LAST AND YOU DIDN'T HAVE MONEY TO GET MORE.: NEVER TRUE

## 2024-09-09 SDOH — ECONOMIC STABILITY: FOOD INSECURITY: WITHIN THE PAST 12 MONTHS, YOU WORRIED THAT YOUR FOOD WOULD RUN OUT BEFORE YOU GOT MONEY TO BUY MORE.: NEVER TRUE

## 2024-09-09 SDOH — ECONOMIC STABILITY: INCOME INSECURITY: HOW HARD IS IT FOR YOU TO PAY FOR THE VERY BASICS LIKE FOOD, HOUSING, MEDICAL CARE, AND HEATING?: NOT HARD AT ALL

## 2024-11-20 ENCOUNTER — OFFICE VISIT (OUTPATIENT)
Dept: FAMILY MEDICINE CLINIC | Facility: CLINIC | Age: 69
End: 2024-11-20

## 2024-11-20 ENCOUNTER — HOSPITAL ENCOUNTER (OUTPATIENT)
Dept: GENERAL RADIOLOGY | Age: 69
Discharge: HOME | End: 2024-11-23
Payer: MEDICARE

## 2024-11-20 VITALS
TEMPERATURE: 97.3 F | HEIGHT: 59 IN | SYSTOLIC BLOOD PRESSURE: 125 MMHG | WEIGHT: 105.2 LBS | OXYGEN SATURATION: 96 % | DIASTOLIC BLOOD PRESSURE: 75 MMHG | HEART RATE: 70 BPM | RESPIRATION RATE: 16 BRPM | BODY MASS INDEX: 21.21 KG/M2

## 2024-11-20 DIAGNOSIS — L03.012 PARONYCHIA OF LEFT THUMB: ICD-10-CM

## 2024-11-20 DIAGNOSIS — Z00.00 MEDICARE ANNUAL WELLNESS VISIT, SUBSEQUENT: Chronic | ICD-10-CM

## 2024-11-20 DIAGNOSIS — G89.29 CHRONIC PAIN OF RIGHT KNEE: ICD-10-CM

## 2024-11-20 DIAGNOSIS — G89.29 CHRONIC PAIN OF RIGHT KNEE: Primary | ICD-10-CM

## 2024-11-20 DIAGNOSIS — M25.561 CHRONIC PAIN OF RIGHT KNEE: ICD-10-CM

## 2024-11-20 DIAGNOSIS — M25.561 CHRONIC PAIN OF RIGHT KNEE: Primary | ICD-10-CM

## 2024-11-20 PROCEDURE — 73562 X-RAY EXAM OF KNEE 3: CPT

## 2024-11-20 RX ORDER — MUPIROCIN 20 MG/G
OINTMENT TOPICAL
Qty: 1 G | Refills: 0 | Status: SHIPPED | OUTPATIENT
Start: 2024-11-20 | End: 2024-11-27

## 2024-11-20 ASSESSMENT — PATIENT HEALTH QUESTIONNAIRE - PHQ9
1. LITTLE INTEREST OR PLEASURE IN DOING THINGS: NOT AT ALL
SUM OF ALL RESPONSES TO PHQ QUESTIONS 1-9: 0
SUM OF ALL RESPONSES TO PHQ QUESTIONS 1-9: 0
SUM OF ALL RESPONSES TO PHQ9 QUESTIONS 1 & 2: 0
2. FEELING DOWN, DEPRESSED OR HOPELESS: NOT AT ALL
SUM OF ALL RESPONSES TO PHQ QUESTIONS 1-9: 0
SUM OF ALL RESPONSES TO PHQ QUESTIONS 1-9: 0

## 2024-11-20 ASSESSMENT — LIFESTYLE VARIABLES
HOW MANY STANDARD DRINKS CONTAINING ALCOHOL DO YOU HAVE ON A TYPICAL DAY: PATIENT DOES NOT DRINK
HOW OFTEN DO YOU HAVE A DRINK CONTAINING ALCOHOL: NEVER

## 2024-11-20 NOTE — PROGRESS NOTES
Medicare Annual Wellness Visit    Rocco Lund is here for Medicare AWV    Assessment & Plan   Chronic pain of right knee  -     XR KNEE RIGHT (3 VIEWS); Future  Medicare annual wellness visit, subsequent  Paronychia of left thumb  -     mupirocin (BACTROBAN) 2 % ointment; Apply topically 3 times daily., Disp-1 g, R-0, Normal  -     cephALEXin (KEFLEX) 250 MG capsule; Take 1 capsule by mouth 4 times daily, Disp-16 capsule, R-0Normal    Recommendations for Preventive Services Due: see orders and patient instructions/AVS.  Recommended screening schedule for the next 5-10 years is provided to the patient in written form: see Patient Instructions/AVS.     Return prn.     Subjective       Patient's complete Health Risk Assessment and screening values have been reviewed and are found in Flowsheets. The following problems were reviewed today and where indicated follow up appointments were made and/or referrals ordered.    Positive Risk Factor Screenings with Interventions:                      Advanced Directives:  Do you have a Living Will?: (!) No    Intervention:  See handout                     Objective   Vitals:    11/20/24 0809   BP: 125/75   Pulse: 70   Resp: 16   Temp: 97.3 °F (36.3 °C)   SpO2: 96%   Weight: 47.7 kg (105 lb 3.2 oz)   Height: 1.499 m (4' 11\")      Body mass index is 21.25 kg/m².                    Allergies   Allergen Reactions    Gemfibrozil Other (See Comments)    Ibandronic Acid Other (See Comments)    Ibuprofen Other (See Comments)     Prior to Visit Medications    Medication Sig Taking? Authorizing Provider   mupirocin (BACTROBAN) 2 % ointment Apply topically 3 times daily. Yes Hal Villafuerte MD   cephALEXin (KEFLEX) 250 MG capsule Take 1 capsule by mouth 4 times daily Yes Hal Villafuerte MD   clopidogrel (PLAVIX) 75 MG tablet Take 1 tablet by mouth daily Yes Hal Villafuerte MD   pantoprazole (PROTONIX) 40 MG tablet Take 1 tablet by mouth daily Yes Hal Villafuerte MD   simvastatin (ZOCOR) 20

## 2025-03-25 ENCOUNTER — OFFICE VISIT (OUTPATIENT)
Dept: FAMILY MEDICINE CLINIC | Facility: CLINIC | Age: 70
End: 2025-03-25
Payer: MEDICARE

## 2025-03-25 VITALS
BODY MASS INDEX: 21.97 KG/M2 | RESPIRATION RATE: 18 BRPM | DIASTOLIC BLOOD PRESSURE: 75 MMHG | HEIGHT: 59 IN | HEART RATE: 77 BPM | TEMPERATURE: 98 F | WEIGHT: 109 LBS | SYSTOLIC BLOOD PRESSURE: 144 MMHG | OXYGEN SATURATION: 97 %

## 2025-03-25 DIAGNOSIS — J02.9 SORE THROAT: ICD-10-CM

## 2025-03-25 DIAGNOSIS — J04.0 LARYNGITIS: ICD-10-CM

## 2025-03-25 DIAGNOSIS — R05.1 ACUTE COUGH: ICD-10-CM

## 2025-03-25 DIAGNOSIS — J06.9 VIRAL URI: Primary | ICD-10-CM

## 2025-03-25 DIAGNOSIS — J41.0 SIMPLE CHRONIC BRONCHITIS (HCC): ICD-10-CM

## 2025-03-25 LAB
EXP DATE SOLUTION: NORMAL
EXP DATE SWAB: NORMAL
EXPIRATION DATE: NORMAL
GROUP A STREP ANTIGEN, POC: NEGATIVE
LOT NUMBER POC: NORMAL
LOT NUMBER SOLUTION: NORMAL
LOT NUMBER SWAB: NORMAL
SARS-COV-2 RNA, POC: NEGATIVE
VALID INTERNAL CONTROL, POC: NORMAL

## 2025-03-25 PROCEDURE — G8420 CALC BMI NORM PARAMETERS: HCPCS | Performed by: FAMILY MEDICINE

## 2025-03-25 PROCEDURE — 3017F COLORECTAL CA SCREEN DOC REV: CPT | Performed by: FAMILY MEDICINE

## 2025-03-25 PROCEDURE — 87635 SARS-COV-2 COVID-19 AMP PRB: CPT | Performed by: FAMILY MEDICINE

## 2025-03-25 PROCEDURE — 1123F ACP DISCUSS/DSCN MKR DOCD: CPT | Performed by: FAMILY MEDICINE

## 2025-03-25 PROCEDURE — 1090F PRES/ABSN URINE INCON ASSESS: CPT | Performed by: FAMILY MEDICINE

## 2025-03-25 PROCEDURE — 3077F SYST BP >= 140 MM HG: CPT | Performed by: FAMILY MEDICINE

## 2025-03-25 PROCEDURE — G8399 PT W/DXA RESULTS DOCUMENT: HCPCS | Performed by: FAMILY MEDICINE

## 2025-03-25 PROCEDURE — 99213 OFFICE O/P EST LOW 20 MIN: CPT | Performed by: FAMILY MEDICINE

## 2025-03-25 PROCEDURE — 3023F SPIROM DOC REV: CPT | Performed by: FAMILY MEDICINE

## 2025-03-25 PROCEDURE — 87880 STREP A ASSAY W/OPTIC: CPT | Performed by: FAMILY MEDICINE

## 2025-03-25 PROCEDURE — 1036F TOBACCO NON-USER: CPT | Performed by: FAMILY MEDICINE

## 2025-03-25 PROCEDURE — 1159F MED LIST DOCD IN RCRD: CPT | Performed by: FAMILY MEDICINE

## 2025-03-25 PROCEDURE — G8427 DOCREV CUR MEDS BY ELIG CLIN: HCPCS | Performed by: FAMILY MEDICINE

## 2025-03-25 PROCEDURE — 3078F DIAST BP <80 MM HG: CPT | Performed by: FAMILY MEDICINE

## 2025-03-25 SDOH — ECONOMIC STABILITY: FOOD INSECURITY: WITHIN THE PAST 12 MONTHS, THE FOOD YOU BOUGHT JUST DIDN'T LAST AND YOU DIDN'T HAVE MONEY TO GET MORE.: NEVER TRUE

## 2025-03-25 SDOH — ECONOMIC STABILITY: FOOD INSECURITY: WITHIN THE PAST 12 MONTHS, YOU WORRIED THAT YOUR FOOD WOULD RUN OUT BEFORE YOU GOT MONEY TO BUY MORE.: NEVER TRUE

## 2025-03-25 ASSESSMENT — PATIENT HEALTH QUESTIONNAIRE - PHQ9
SUM OF ALL RESPONSES TO PHQ QUESTIONS 1-9: 0
1. LITTLE INTEREST OR PLEASURE IN DOING THINGS: NOT AT ALL
2. FEELING DOWN, DEPRESSED OR HOPELESS: NOT AT ALL

## 2025-03-25 NOTE — PROGRESS NOTES
Subjective:  Rocco Lund is a 70 y.o. female presents today with a complaint with a two day history of nasal congestion, slight sore throat and nasal drainage, slight loss of voice no real fever, no significant headache, neck pain, or rash or chills. Slight np cough.    Allergies   Allergen Reactions    Gemfibrozil Other (See Comments)    Ibandronate Other (See Comments)    Ibuprofen Other (See Comments)     PMH, MEDS reviewed    Objective:   Blood pressure (!) 144/75, pulse 77, temperature 98 °F (36.7 °C), resp. rate 18, height 1.499 m (4' 11\"), weight 49.4 kg (109 lb), SpO2 97%.  General- pleasant, no distress  Psych- alert and oriented to person, place and time  Mood and affect are appropriate to the visit  rrr s mrg. bcta  neck exam is unremarkable without significant lymphadenopathy, and oropharynx in particular is clear. Remainder of HEENT exam is un-impressive and unremarkable.  Skin s rash.   Rapid covid test: neg  Rapid strep test: neg    Assessment:  1. Viral URI    2. Acute cough    3. Sore throat    4. Laryngitis    5. Simple chronic bronchitis (HCC)      (viral nasopharyngitis)    Plan:     Reassurance given  Patience, warm salt water gargles    Followup:  Call if questions, no improvement or fever develops and if symptoms worsen.  No follow-up provider specified.